# Patient Record
Sex: FEMALE | URBAN - METROPOLITAN AREA
[De-identification: names, ages, dates, MRNs, and addresses within clinical notes are randomized per-mention and may not be internally consistent; named-entity substitution may affect disease eponyms.]

---

## 2017-01-06 NOTE — PATIENT DISCUSSION
Post-injection Counseling: Patients treated with intravitreal injection may notice eye irritation or  burning for 12-24 hours after injection. Some patients may have a small patch of hemorrhage under the skin of the eye, which will fade in a few days. Worsening pain, decreasing vision, or new floaters should prompt you to call the office.

## 2017-01-06 NOTE — PATIENT DISCUSSION
Nonproliferative Diabetic Retinopathy Counseling: The importance of controlling blood glucose, blood pressure and lipid levels was emphasized to minimize the risk of progression of retinal complications from diabetes. The patient should return for periodic dilated eye exams as scheduled.

## 2017-06-09 NOTE — PATIENT DISCUSSION
DIABETIC MACULAR EDEMA, OD:  AVASTIN (1.25MG) INTRAVITREAL INJECTION TODAY.   RETURN FOR FOLLOW-UP AS SCHEDULED FOR DILATED EYE EXAM.

## 2017-09-15 NOTE — PATIENT DISCUSSION
RECOMMEND PATIENT RETURN TO DR. HERNANDEZ FOR REFRACTION AND DIPLOPIA EVAL (POSSIBLE DIVERGENCE INSUFFICIENCY).

## 2018-03-27 NOTE — PATIENT DISCUSSION
Recommend follow up in 3 weeks to re evaluate chemosis.  list given-make an appt in 6 weeks. Follow up with Dr. Chan Jimenez after prosthesis made.

## 2018-04-17 NOTE — PATIENT DISCUSSION
Patient is continuing to heal well s/p evisceration. Recommend Erythromycin ointment (3x daily for three weeks) to help with current swelling. Recommend wearing an eye pad during the day to help protect the area. Follow up in 3 weeks.

## 2018-07-18 NOTE — PATIENT DISCUSSION
NONPROLIFERATIVE DIABETIC RETINOPATHY, OU: STABLE.  RETURN FOR FOLLOW-UP AS SCHEDULED FOR DILATED EYE EXAM.

## 2018-07-18 NOTE — PATIENT DISCUSSION
DIABETIC MACULAR EDEMA, OD: AVASTIN (1.25MG) INTRAVITREAL INJECTION TODAY. DECREASE VISITS TO Q 10 WEEKS.  RETURN AS SCHEDULED

## 2020-11-17 ENCOUNTER — IMPORTED ENCOUNTER (OUTPATIENT)
Dept: URBAN - METROPOLITAN AREA CLINIC 50 | Facility: CLINIC | Age: 80
End: 2020-11-17

## 2021-04-23 ASSESSMENT — TONOMETRY
OS_IOP_MMHG: 16
OD_IOP_MMHG: 16

## 2021-04-23 ASSESSMENT — VISUAL ACUITY
OS_BAT: 20/50
OS_CC: 20/30
OD_OTHER: 20/50. 20/100.
OD_CC: 20/30-
OS_CC: J1
OS_OTHER: 20/50. 20/100.
OD_CC: J1
OD_BAT: 20/50

## 2021-11-15 ENCOUNTER — PREPPED CHART (OUTPATIENT)
Dept: URBAN - METROPOLITAN AREA CLINIC 53 | Facility: CLINIC | Age: 81
End: 2021-11-15

## 2022-05-09 ENCOUNTER — NEW PATIENT (OUTPATIENT)
Dept: URBAN - METROPOLITAN AREA CLINIC 49 | Facility: CLINIC | Age: 82
End: 2022-05-09

## 2022-05-09 DIAGNOSIS — H43.813: ICD-10-CM

## 2022-05-09 DIAGNOSIS — H25.13: ICD-10-CM

## 2022-05-09 PROCEDURE — 92134 CPTRZ OPH DX IMG PST SGM RTA: CPT

## 2022-05-09 PROCEDURE — 92015 DETERMINE REFRACTIVE STATE: CPT

## 2022-05-09 PROCEDURE — 99204 OFFICE O/P NEW MOD 45 MIN: CPT

## 2022-05-09 ASSESSMENT — VISUAL ACUITY
OS_GLARE: 20/60
OD_PH: 20/60
OS_GLARE: 20/40
OS_PH: 20/50
OU_CC: J2@18"
OD_CC: 20/200+2
OS_CC: 20/60-1

## 2022-05-09 ASSESSMENT — TONOMETRY
OS_IOP_MMHG: 11
OD_IOP_MMHG: 12

## 2022-05-10 ENCOUNTER — DIAGNOSTICS ONLY (OUTPATIENT)
Dept: URBAN - METROPOLITAN AREA CLINIC 53 | Facility: CLINIC | Age: 82
End: 2022-05-10

## 2022-05-10 DIAGNOSIS — H25.13: ICD-10-CM

## 2022-05-10 PROCEDURE — 92136 OPHTHALMIC BIOMETRY: CPT

## 2022-05-10 PROCEDURE — 92025IOL CORNEAL TOPOGRAPHY PREMIUM IOL

## 2022-05-10 ASSESSMENT — KERATOMETRY
OS_AXISANGLE2_DEGREES: 170
OS_K1POWER_DIOPTERS: 46.50
OD_K1POWER_DIOPTERS: 46.00
OS_AXISANGLE_DEGREES: 80
OD_AXISANGLE_DEGREES: 105
OD_AXISANGLE2_DEGREES: 15
OD_K2POWER_DIOPTERS: 44.37
OS_K2POWER_DIOPTERS: 45.00

## 2022-05-18 ENCOUNTER — PRE-OP/H&P (OUTPATIENT)
Dept: URBAN - METROPOLITAN AREA CLINIC 53 | Facility: CLINIC | Age: 82
End: 2022-05-18

## 2022-05-18 DIAGNOSIS — H25.813: ICD-10-CM

## 2022-05-18 DIAGNOSIS — H43.813: ICD-10-CM

## 2022-05-18 PROCEDURE — PREOP PRE OP VISIT

## 2022-05-18 ASSESSMENT — TONOMETRY
OS_IOP_MMHG: 12
OD_IOP_MMHG: 12

## 2022-05-18 ASSESSMENT — KERATOMETRY
OD_K1POWER_DIOPTERS: 46.00
OS_K1POWER_DIOPTERS: 46.50
OS_AXISANGLE2_DEGREES: 170
OD_K2POWER_DIOPTERS: 44.37
OS_K2POWER_DIOPTERS: 45.00
OS_AXISANGLE_DEGREES: 80
OD_AXISANGLE_DEGREES: 105
OD_AXISANGLE2_DEGREES: 15

## 2022-05-18 ASSESSMENT — VISUAL ACUITY
OD_PH: 20/40
OS_CC: 20/40
OD_CC: 20/70

## 2022-06-15 ASSESSMENT — KERATOMETRY
OD_AXISANGLE2_DEGREES: 15
OS_AXISANGLE_DEGREES: 80
OS_K2POWER_DIOPTERS: 45.00
OS_AXISANGLE2_DEGREES: 170
OS_K1POWER_DIOPTERS: 46.50
OD_K2POWER_DIOPTERS: 44.37
OD_AXISANGLE_DEGREES: 105
OD_K1POWER_DIOPTERS: 46.00

## 2022-06-16 ENCOUNTER — SURGERY/PROCEDURE (OUTPATIENT)
Dept: URBAN - METROPOLITAN AREA SURGERY 16 | Facility: SURGERY | Age: 82
End: 2022-06-16

## 2022-06-16 ENCOUNTER — POST-OP (OUTPATIENT)
Dept: URBAN - METROPOLITAN AREA CLINIC 49 | Facility: CLINIC | Age: 82
End: 2022-06-16

## 2022-06-16 DIAGNOSIS — H25.811: ICD-10-CM

## 2022-06-16 DIAGNOSIS — Z96.1: ICD-10-CM

## 2022-06-16 DIAGNOSIS — Z98.41: ICD-10-CM

## 2022-06-16 PROCEDURE — 66984 XCAPSL CTRC RMVL W/O ECP: CPT

## 2022-06-16 PROCEDURE — 99199PSUP SUPERIOR VISION PACKAGE

## 2022-06-16 ASSESSMENT — KERATOMETRY
OS_K1POWER_DIOPTERS: 46.50
OD_AXISANGLE2_DEGREES: 15
OD_K1POWER_DIOPTERS: 46.00
OS_AXISANGLE2_DEGREES: 170
OS_AXISANGLE_DEGREES: 80
OS_K2POWER_DIOPTERS: 45.00
OD_AXISANGLE_DEGREES: 105
OD_K2POWER_DIOPTERS: 44.37

## 2022-06-16 ASSESSMENT — TONOMETRY: OD_IOP_MMHG: 12

## 2022-06-16 ASSESSMENT — VISUAL ACUITY: OD_SC: 20/80-1

## 2022-06-22 ENCOUNTER — POST OP/EVAL OF SECOND EYE (OUTPATIENT)
Dept: URBAN - METROPOLITAN AREA CLINIC 53 | Facility: CLINIC | Age: 82
End: 2022-06-22

## 2022-06-22 DIAGNOSIS — H25.812: ICD-10-CM

## 2022-06-22 DIAGNOSIS — Z98.41: ICD-10-CM

## 2022-06-22 PROCEDURE — PREOP PRE OP VISIT

## 2022-06-22 PROCEDURE — 92136 - 2N OPHTHALMIC BIOMETRY BY PARTIAL COHERENCE INTERFEROMETRY WITH INTRAOCULAR LENS POWER CALCULATION

## 2022-06-22 ASSESSMENT — TONOMETRY
OD_IOP_MMHG: 14
OS_IOP_MMHG: 14

## 2022-06-22 ASSESSMENT — VISUAL ACUITY
OD_SC: J2 @ 16"
OD_SC: 20/20
OS_SC: 20/50
OD_SC: J1 @ 22"

## 2022-06-30 ENCOUNTER — POST-OP (OUTPATIENT)
Dept: URBAN - METROPOLITAN AREA CLINIC 49 | Facility: CLINIC | Age: 82
End: 2022-06-30

## 2022-06-30 ENCOUNTER — SURGERY/PROCEDURE (OUTPATIENT)
Dept: URBAN - METROPOLITAN AREA SURGERY 16 | Facility: SURGERY | Age: 82
End: 2022-06-30

## 2022-06-30 DIAGNOSIS — H25.812: ICD-10-CM

## 2022-06-30 DIAGNOSIS — Z98.42: ICD-10-CM

## 2022-06-30 DIAGNOSIS — Z98.41: ICD-10-CM

## 2022-06-30 PROCEDURE — 66984 XCAPSL CTRC RMVL W/O ECP: CPT

## 2022-06-30 PROCEDURE — 99199PSUP SUPERIOR VISION PACKAGE

## 2022-06-30 ASSESSMENT — VISUAL ACUITY
OS_SC: 20/80
OS_PH: 20/50

## 2022-06-30 ASSESSMENT — TONOMETRY: OS_IOP_MMHG: 12

## 2022-07-06 ENCOUNTER — POST-OP (OUTPATIENT)
Dept: URBAN - METROPOLITAN AREA CLINIC 53 | Facility: CLINIC | Age: 82
End: 2022-07-06

## 2022-07-06 DIAGNOSIS — Z98.42: ICD-10-CM

## 2022-07-06 PROCEDURE — 99024 POSTOP FOLLOW-UP VISIT: CPT

## 2022-07-06 ASSESSMENT — VISUAL ACUITY
OS_SC: 20/20
OD_SC: 20/20
OU_SC: J5
OU_SC: J7
OU_SC: 20/20

## 2022-07-06 ASSESSMENT — TONOMETRY
OD_IOP_MMHG: 12
OS_IOP_MMHG: 12

## 2022-08-03 ENCOUNTER — POST-OP (OUTPATIENT)
Dept: URBAN - METROPOLITAN AREA CLINIC 53 | Facility: CLINIC | Age: 82
End: 2022-08-03

## 2022-08-03 DIAGNOSIS — Z98.42: ICD-10-CM

## 2022-08-03 DIAGNOSIS — Z98.41: ICD-10-CM

## 2022-08-03 PROCEDURE — 99024 POSTOP FOLLOW-UP VISIT: CPT

## 2022-08-03 ASSESSMENT — VISUAL ACUITY
OS_SC: 20/25
OU_CC: J1+
OD_SC: 20/20
OU_SC: 20/20

## 2022-08-03 ASSESSMENT — TONOMETRY
OD_IOP_MMHG: 11
OS_IOP_MMHG: 12

## 2023-08-10 ENCOUNTER — COMPREHENSIVE EXAM (OUTPATIENT)
Dept: URBAN - METROPOLITAN AREA CLINIC 53 | Facility: CLINIC | Age: 83
End: 2023-08-10

## 2023-08-10 DIAGNOSIS — H02.831: ICD-10-CM

## 2023-08-10 DIAGNOSIS — H43.813: ICD-10-CM

## 2023-08-10 DIAGNOSIS — H26.493: ICD-10-CM

## 2023-08-10 DIAGNOSIS — H35.372: ICD-10-CM

## 2023-08-10 DIAGNOSIS — H02.834: ICD-10-CM

## 2023-08-10 DIAGNOSIS — H04.123: ICD-10-CM

## 2023-08-10 DIAGNOSIS — H35.361: ICD-10-CM

## 2023-08-10 PROCEDURE — 92014 COMPRE OPH EXAM EST PT 1/>: CPT

## 2023-08-10 ASSESSMENT — VISUAL ACUITY
OS_GLARE: 20/20
OD_SC: 20/20
OS_GLARE: 20/20
OS_SC: 20/20-1
OD_GLARE: 20/25
OD_GLARE: 20/20
OU_CC: J1+ @ 18"
OU_SC: 20/20-1

## 2023-08-10 ASSESSMENT — TONOMETRY
OS_IOP_MMHG: 10
OD_IOP_MMHG: 10

## 2023-08-10 ASSESSMENT — KERATOMETRY
OD_K2POWER_DIOPTERS: 45.25
OD_K1POWER_DIOPTERS: 44.75
OS_K1POWER_DIOPTERS: 44.75
OD_AXISANGLE_DEGREES: 018
OD_AXISANGLE2_DEGREES: 108
OS_AXISANGLE_DEGREES: 173
OS_AXISANGLE2_DEGREES: 83
OS_K2POWER_DIOPTERS: 46.50